# Patient Record
Sex: FEMALE | Race: WHITE | NOT HISPANIC OR LATINO | Employment: FULL TIME | URBAN - METROPOLITAN AREA
[De-identification: names, ages, dates, MRNs, and addresses within clinical notes are randomized per-mention and may not be internally consistent; named-entity substitution may affect disease eponyms.]

---

## 2019-06-03 ENCOUNTER — HOSPITAL ENCOUNTER (OUTPATIENT)
Dept: URGENT CARE | Facility: CLINIC | Age: 29
Discharge: HOME | End: 2019-06-03

## 2020-02-12 ENCOUNTER — HOSPITAL ENCOUNTER (OUTPATIENT)
Dept: URGENT CARE | Facility: CLINIC | Age: 30
Discharge: HOME | End: 2020-02-12
Attending: FAMILY MEDICINE

## 2020-02-14 LAB — BACTERIA SPEC AEROBE CULT: NORMAL

## 2020-10-16 ENCOUNTER — HOSPITAL ENCOUNTER (OUTPATIENT)
Dept: URGENT CARE | Facility: CLINIC | Age: 30
Discharge: HOME | End: 2020-10-16
Attending: PHYSICIAN ASSISTANT

## 2020-10-17 LAB — BACTERIA SPEC AEROBE CULT: ABNORMAL

## 2021-06-10 LAB
ANION GAP SERPL CALCULATED.3IONS-SCNC: 10.6 MMOL/L (ref 5–15)
BASOPHILS # BLD AUTO: 0.04 10*3/MM3 (ref 0–0.2)
BASOPHILS NFR BLD AUTO: 0.3 % (ref 0–1.5)
BUN SERPL-MCNC: 8 MG/DL (ref 6–20)
BUN/CREAT SERPL: 13.6 (ref 7–25)
CALCIUM SPEC-SCNC: 9.6 MG/DL (ref 8.6–10.5)
CHLORIDE SERPL-SCNC: 101 MMOL/L (ref 98–107)
CO2 SERPL-SCNC: 24.4 MMOL/L (ref 22–29)
CREAT SERPL-MCNC: 0.59 MG/DL (ref 0.57–1)
D DIMER PPP FEU-MCNC: 1.95 MG/L (FEU) (ref 0–0.59)
DEPRECATED RDW RBC AUTO: 40.7 FL (ref 37–54)
EOSINOPHIL # BLD AUTO: 0.29 10*3/MM3 (ref 0–0.4)
EOSINOPHIL NFR BLD AUTO: 2.4 % (ref 0.3–6.2)
ERYTHROCYTE [DISTWIDTH] IN BLOOD BY AUTOMATED COUNT: 12.4 % (ref 12.3–15.4)
GFR SERPL CREATININE-BSD FRML MDRD: 121 ML/MIN/1.73
GFR SERPL CREATININE-BSD FRML MDRD: 147 ML/MIN/1.73
GLUCOSE SERPL-MCNC: 125 MG/DL (ref 65–99)
HCT VFR BLD AUTO: 36.9 % (ref 34–46.6)
HGB BLD-MCNC: 12.5 G/DL (ref 12–15.9)
IMM GRANULOCYTES # BLD AUTO: 0.1 10*3/MM3 (ref 0–0.05)
IMM GRANULOCYTES NFR BLD AUTO: 0.8 % (ref 0–0.5)
LYMPHOCYTES # BLD AUTO: 2.62 10*3/MM3 (ref 0.7–3.1)
LYMPHOCYTES NFR BLD AUTO: 21.6 % (ref 19.6–45.3)
MCH RBC QN AUTO: 30.3 PG (ref 26.6–33)
MCHC RBC AUTO-ENTMCNC: 33.9 G/DL (ref 31.5–35.7)
MCV RBC AUTO: 89.6 FL (ref 79–97)
MONOCYTES # BLD AUTO: 0.85 10*3/MM3 (ref 0.1–0.9)
MONOCYTES NFR BLD AUTO: 7 % (ref 5–12)
NEUTROPHILS NFR BLD AUTO: 67.9 % (ref 42.7–76)
NEUTROPHILS NFR BLD AUTO: 8.22 10*3/MM3 (ref 1.7–7)
NRBC BLD AUTO-RTO: 0 /100 WBC (ref 0–0.2)
PLATELET # BLD AUTO: 249 10*3/MM3 (ref 140–450)
PMV BLD AUTO: 10 FL (ref 6–12)
POTASSIUM SERPL-SCNC: 4.1 MMOL/L (ref 3.5–5.2)
RBC # BLD AUTO: 4.12 10*6/MM3 (ref 3.77–5.28)
SODIUM SERPL-SCNC: 136 MMOL/L (ref 136–145)
WBC # BLD AUTO: 12.12 10*3/MM3 (ref 3.4–10.8)

## 2021-06-10 PROCEDURE — 80048 BASIC METABOLIC PNL TOTAL CA: CPT | Performed by: EMERGENCY MEDICINE

## 2021-06-10 PROCEDURE — 36415 COLL VENOUS BLD VENIPUNCTURE: CPT | Performed by: EMERGENCY MEDICINE

## 2021-06-10 PROCEDURE — 85379 FIBRIN DEGRADATION QUANT: CPT | Performed by: EMERGENCY MEDICINE

## 2021-06-10 PROCEDURE — 99282 EMERGENCY DEPT VISIT SF MDM: CPT

## 2021-06-10 PROCEDURE — 85025 COMPLETE CBC W/AUTO DIFF WBC: CPT | Performed by: EMERGENCY MEDICINE

## 2021-06-10 PROCEDURE — 99283 EMERGENCY DEPT VISIT LOW MDM: CPT

## 2021-06-11 ENCOUNTER — HOSPITAL ENCOUNTER (EMERGENCY)
Facility: HOSPITAL | Age: 31
Discharge: HOME | End: 2021-06-11
Attending: EMERGENCY MEDICINE | Admitting: EMERGENCY MEDICINE

## 2021-06-11 ENCOUNTER — APPOINTMENT (OUTPATIENT)
Dept: CARDIOLOGY | Facility: HOSPITAL | Age: 31
End: 2021-06-11

## 2021-06-11 VITALS
OXYGEN SATURATION: 98 % | DIASTOLIC BLOOD PRESSURE: 87 MMHG | HEART RATE: 103 BPM | BODY MASS INDEX: 31.42 KG/M2 | TEMPERATURE: 98.1 F | SYSTOLIC BLOOD PRESSURE: 138 MMHG | RESPIRATION RATE: 18 BRPM | WEIGHT: 200.18 LBS | HEIGHT: 67 IN

## 2021-06-11 DIAGNOSIS — S86.811A STRAIN OF CALF MUSCLE, RIGHT, INITIAL ENCOUNTER: Primary | ICD-10-CM

## 2021-06-11 DIAGNOSIS — Z3A.29 29 WEEKS GESTATION OF PREGNANCY: ICD-10-CM

## 2021-06-11 LAB
BH CV LOWER VASCULAR LEFT COMMON FEMORAL AUGMENT: NORMAL
BH CV LOWER VASCULAR LEFT COMMON FEMORAL COMPETENT: NORMAL
BH CV LOWER VASCULAR LEFT COMMON FEMORAL COMPRESS: NORMAL
BH CV LOWER VASCULAR LEFT COMMON FEMORAL PHASIC: NORMAL
BH CV LOWER VASCULAR LEFT COMMON FEMORAL SPONT: NORMAL
BH CV LOWER VASCULAR RIGHT COMMON FEMORAL AUGMENT: NORMAL
BH CV LOWER VASCULAR RIGHT COMMON FEMORAL COMPETENT: NORMAL
BH CV LOWER VASCULAR RIGHT COMMON FEMORAL COMPRESS: NORMAL
BH CV LOWER VASCULAR RIGHT COMMON FEMORAL PHASIC: NORMAL
BH CV LOWER VASCULAR RIGHT COMMON FEMORAL SPONT: NORMAL
BH CV LOWER VASCULAR RIGHT DISTAL FEMORAL COMPRESS: NORMAL
BH CV LOWER VASCULAR RIGHT GREATER SAPH AK COMPRESS: NORMAL
BH CV LOWER VASCULAR RIGHT MID FEMORAL AUGMENT: NORMAL
BH CV LOWER VASCULAR RIGHT MID FEMORAL COMPETENT: NORMAL
BH CV LOWER VASCULAR RIGHT MID FEMORAL COMPRESS: NORMAL
BH CV LOWER VASCULAR RIGHT MID FEMORAL PHASIC: NORMAL
BH CV LOWER VASCULAR RIGHT MID FEMORAL SPONT: NORMAL
BH CV LOWER VASCULAR RIGHT PERONEAL COMPRESS: NORMAL
BH CV LOWER VASCULAR RIGHT POPLITEAL AUGMENT: NORMAL
BH CV LOWER VASCULAR RIGHT POPLITEAL COMPETENT: NORMAL
BH CV LOWER VASCULAR RIGHT POPLITEAL COMPRESS: NORMAL
BH CV LOWER VASCULAR RIGHT POPLITEAL PHASIC: NORMAL
BH CV LOWER VASCULAR RIGHT POPLITEAL SPONT: NORMAL
BH CV LOWER VASCULAR RIGHT POSTERIOR TIBIAL COMPRESS: NORMAL
BH CV LOWER VASCULAR RIGHT PROFUNDA FEMORAL COMPRESS: NORMAL
BH CV LOWER VASCULAR RIGHT PROXIMAL FEMORAL COMPRESS: NORMAL
BH CV LOWER VASCULAR RIGHT SAPHENOFEMORAL JUNCTION COMPRESS: NORMAL
MAXIMAL PREDICTED HEART RATE: 192 BPM
STRESS TARGET HR: 163 BPM

## 2021-06-11 PROCEDURE — 93971 EXTREMITY STUDY: CPT | Performed by: SURGERY

## 2021-06-11 PROCEDURE — 93971 EXTREMITY STUDY: CPT

## 2021-06-11 NOTE — ED PROVIDER NOTES
Subjective   Pt reports that she hasp resented to the ED with complaints of right leg pain that she has had for about 2 days. She states that she was originally awoken from sleep with 3 different episodes in one night due to pain. She states that she spoke to her coworker about this and they thought it sounded like a possible DVT. 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     Pt reports that she is currently 29 weeks pregnant.       History provided by:  Patient   used: No    Leg Pain  Location:  Leg  Injury: no    Leg location:  R leg  Pain details:     Quality:  Cramping    Radiates to:  Does not radiate    Severity:  Moderate    Onset quality:  Sudden    Timing:  Constant    Progression:  Unchanged  Chronicity:  New  Dislocation: no    Foreign body present:  No foreign bodies  Tetanus status:  Unknown  Prior injury to area:  No  Relieved by:  None tried  Worsened by:  Nothing  Ineffective treatments:  None tried  Associated symptoms: no back pain, no decreased ROM, no fatigue, no fever, no itching, no muscle weakness, no neck pain, no numbness, no stiffness, no swelling and no tingling        Review of Systems   Constitutional: Negative for chills, fatigue and fever.   HENT: Negative for congestion, ear pain, rhinorrhea, sore throat and tinnitus.    Eyes: Negative for photophobia and pain.   Respiratory: Negative for choking and shortness of breath.    Cardiovascular: Negative for chest pain.   Gastrointestinal: Negative for abdominal pain, diarrhea, nausea and vomiting.   Endocrine: Negative for polydipsia.   Genitourinary: Negative for dysuria and hematuria.   Musculoskeletal: Negative for back pain, neck pain and stiffness.   Skin: Negative for itching and rash.   Allergic/Immunologic: Negative for food allergies.   Neurological: Negative for dizziness, seizures, numbness and headaches.    Psychiatric/Behavioral: Negative for self-injury and suicidal ideas.   All other systems reviewed and are negative.      History reviewed. No pertinent past medical history.    Allergies   Allergen Reactions   • Oxycodone-Acetaminophen Unknown - High Severity, Other (See Comments) and Swelling     Numbness and tingling     • Topiramate Other (See Comments) and Unknown - High Severity   • Wasp Venom Hives       Past Surgical History:   Procedure Laterality Date   • WISDOM TOOTH EXTRACTION         History reviewed. No pertinent family history.    Social History     Socioeconomic History   • Marital status: Single     Spouse name: Not on file   • Number of children: Not on file   • Years of education: Not on file   • Highest education level: Not on file   Tobacco Use   • Smoking status: Never Smoker   • Smokeless tobacco: Never Used   Vaping Use   • Vaping Use: Never used   Substance and Sexual Activity   • Alcohol use: Never   • Drug use: Never   • Sexual activity: Defer         Objective   Physical Exam  HENT:      Head: Normocephalic and atraumatic.      Right Ear: Tympanic membrane, ear canal and external ear normal.      Left Ear: Tympanic membrane, ear canal and external ear normal.      Nose: Nose normal.      Mouth/Throat:      Mouth: Mucous membranes are moist.   Eyes:      General: Lids are normal.   Cardiovascular:      Rate and Rhythm: Normal rate and regular rhythm.      Pulses: Normal pulses.           Dorsalis pedis pulses are 2+ on the right side and 2+ on the left side.      Heart sounds: Normal heart sounds.   Pulmonary:      Effort: Pulmonary effort is normal.      Breath sounds: Normal breath sounds and air entry.   Abdominal:      General: Bowel sounds are normal.      Palpations: Abdomen is soft.      Comments: GRAVID UTERUS CONSISTENT WITH DATES   Musculoskeletal:      Right shoulder: Normal.      Left shoulder: Normal.      Right upper arm: Normal.      Left upper arm: Normal.      Right  elbow: Normal.      Left elbow: Normal.      Right forearm: Normal.      Left forearm: Normal.      Right wrist: Normal.      Left wrist: Normal.      Right hand: Normal.      Left hand: Normal.      Cervical back: Normal, full passive range of motion without pain, normal range of motion and neck supple.      Thoracic back: Normal.      Lumbar back: Normal.      Right hip: Normal.      Left hip: Normal.      Right upper leg: Normal.      Left upper leg: Normal.      Right knee: Normal.      Left knee: Normal.      Right lower leg: Tenderness (Moderate tenderness posterior right calf) present. Edema (MILD) present.      Left lower leg: Normal.      Right ankle: Normal.      Right Achilles Tendon: Normal.      Left ankle: Normal.      Left Achilles Tendon: Normal.      Right foot: Normal.      Left foot: Normal.   Skin:     General: Skin is warm and dry.   Neurological:      General: No focal deficit present.      Mental Status: She is alert and oriented to person, place, and time. Mental status is at baseline.      Cranial Nerves: Cranial nerves are intact.      Sensory: Sensation is intact.      Motor: Motor function is intact.      Coordination: Coordination is intact.   Psychiatric:         Attention and Perception: Attention and perception normal.         Mood and Affect: Mood and affect normal.         Speech: Speech normal.         Behavior: Behavior normal. Behavior is cooperative.         Thought Content: Thought content normal.         Cognition and Memory: Cognition and memory normal.         Judgment: Judgment normal.         Procedures         ED Course  ED Course as of Jun 11 0657 Fri Jun 11, 2021 0311 Patient is 29 weeks pregnant.   D-Dimer, Quant(!): 1.95 [VS]      ED Course User Index  [VS] Gene Farah MD     This patient is a pleasant 30-year-old female currently at 29 weeks gestational age in her pregnancy who was awakened the other night 3 times in a row with severe muscle cramps in her  right calf, and the last day or so has had pain in the posterior calf following this.    She was told by a friend this could represent a DVT and to come to the ER.    I reviewed her lab work that had already been ordered and her D-dimer was elevated in the setting of third trimester pregnancy, but also in the setting of the calf pain.    At this point I had to order a Doppler ultrasound of the right lower extremity to rule out DVT, and fortunately this came back negative.    I am diagnosing her with calf muscle strain following a muscle cramp, and recommend supportive care instructions including rest stretching elevation and Tylenol.                                       MDM    Final diagnoses:   Strain of calf muscle, right, initial encounter   29 weeks gestation of pregnancy       Documentation assistance provided by ford Fernandez.  Information recorded by the scribe was done at my direction and has been verified and validated by me.     Kristin Fernandez  06/11/21 0315       Gene Farah MD  06/11/21 2043

## 2024-02-20 ENCOUNTER — OFFICE VISIT (OUTPATIENT)
Dept: SLEEP MEDICINE | Facility: HOSPITAL | Age: 34
End: 2024-02-20
Payer: COMMERCIAL

## 2024-02-20 VITALS
HEIGHT: 66 IN | OXYGEN SATURATION: 97 % | BODY MASS INDEX: 26.97 KG/M2 | HEART RATE: 76 BPM | WEIGHT: 167.8 LBS | SYSTOLIC BLOOD PRESSURE: 110 MMHG | DIASTOLIC BLOOD PRESSURE: 62 MMHG

## 2024-02-20 DIAGNOSIS — R06.83 SNORING: ICD-10-CM

## 2024-02-20 DIAGNOSIS — R06.81 WITNESSED APNEIC SPELLS: ICD-10-CM

## 2024-02-20 DIAGNOSIS — G47.10 HYPERSOMNIA: ICD-10-CM

## 2024-02-20 DIAGNOSIS — R29.818 SUSPECTED SLEEP APNEA: Primary | ICD-10-CM

## 2024-02-20 DIAGNOSIS — E66.3 OVERWEIGHT (BMI 25.0-29.9): ICD-10-CM

## 2024-02-20 PROCEDURE — G0463 HOSPITAL OUTPT CLINIC VISIT: HCPCS

## 2024-02-20 RX ORDER — TOPIRAMATE 25 MG/1
25 TABLET ORAL EVERY EVENING
COMMUNITY
Start: 2024-01-31

## 2024-02-20 NOTE — PROGRESS NOTES
Sleep Consultation    Patient Name: Ingrid Corado  Age/Sex: 33 y.o. female  : 1990  MRN: [id number]    Date of Encounter Visit: 2024  Encounter Provider: Ladarius Gunn MD  Referring Provider: Tierra Waite*  Place of Service: [facility]  Patient Care Team:  Wen Beard APRN as PCP - General (Nurse Practitioner)    Subjective:     Reason for Consult: JOSE evaluation    History of Present Illness:  Ingrid Corado is a 33 y.o. female is here for evaluation of JOSE due to suggestive symptoms.    Patient complains of daytime fatigue and sleepiness with an [place] Sleepiness Scale (ESS) of 12.  Patient is on phentermine (and topiramate) for weight loss which can help with any insomnia because it is neurostimulant as well  Patient complains of loud snoring waking up gasping for breath, being told that she could breathing at night, morning headache, and waking up with a sore dry mouth  Denies any symptoms of restless leg syndrome.  But she does have some leg jerking in the middle of the night  Patient denies any cataplexy, sleep paralysis or other symptoms to suggest narcolepsy.  Patient does have history of sleepwalking  Denies any history of seizure disorder or recent head trauma.  Patient spends adequate amount of time in bed with no evidence of sleep restriction or improper sleep hygiene.  Bedtime is around 10 PM wake up time 6 in the morning with sleep onset of the normal and patient still wake up feeling sleepy and tired  Caffeine intake is usually half cup of coffee per day, no alcohol smoking or illicit substance abuse  Comorbidities include: Overweight    Review of Systems:   A twelve-system review was conducted and was negative except for the following: Fatigue.        Past Medical History:  Past Medical History:   Diagnosis Date    Anemia        Past Surgical History:   Procedure Laterality Date    DENTAL PROCEDURE      WISDOM TOOTH EXTRACTION         Home  "Medications:     Current Outpatient Medications:     phentermine 15 MG capsule, , Disp: , Rfl:     topiramate (TOPAMAX) 25 MG tablet, Take 1 tablet by mouth Every Evening., Disp: , Rfl:     azithromycin (Zithromax Z-Deandre) 250 MG tablet, Take 2 tablets by mouth on day 1, then 1 tablet daily on days 2-5 (Patient not taking: Reported on 2/20/2024), Disp: 6 tablet, Rfl: 0    promethazine-dextromethorphan (PROMETHAZINE-DM) 6.25-15 MG/5ML syrup, Take 5 mL by mouth 4 (Four) Times a Day As Needed for Cough. (Patient not taking: Reported on 2/20/2024), Disp: 100 mL, Rfl: 0    Allergies:  Allergies   Allergen Reactions    Oxycodone-Acetaminophen Other (See Comments), Swelling, Unknown - High Severity and Anaphylaxis     Numbness and tingling    Topiramate Anaphylaxis, Swelling, Other (See Comments) and Unknown - High Severity     Other reaction(s): tongue swelling    **Patient states she is currently taking medication for weight loss-2/20/24    Wasp Venom Hives       Past Social History:  Social History     Socioeconomic History    Marital status: Single   Tobacco Use    Smoking status: Never     Passive exposure: Never    Smokeless tobacco: Never   Vaping Use    Vaping Use: Never used   Substance and Sexual Activity    Alcohol use: Never    Drug use: Never    Sexual activity: Defer       Past Family History:  Family History   Problem Relation Age of Onset    Sleep apnea Mother      Positive family history for obstructive sleep apnea  Objective:        Vital Signs:   Visit Vitals  /62   Pulse 76   Ht 167.6 cm (66\")   Wt 76.1 kg (167 lb 12.8 oz)   SpO2 97%   BMI 27.08 kg/m²     Wt Readings from Last 3 Encounters:   02/20/24 76.1 kg (167 lb 12.8 oz)   01/12/24 77.1 kg (170 lb)   11/06/23 75.3 kg (166 lb 1.6 oz)     Neck Circumference: 13.5 inches    Physical Exam:   GEN:  No acute distress, alert, cooperative, well developed   EYES:   Sclerae clear. No icterus. PERRL. Normal EOM  ENT:   External ears/nose normal, no oral " lesions, no thrush, mucous membranes moist, Septum midline. Mallampati II airway. Redundant membranous soft palate   NECK:  Supple, midline trachea, no JVD  LUNGS: Normal chest on inspection, CTAB, no wheezes. No rhonchi. No crackles. Respirations regular, even and unlabored.   CV:  Regular rhythm and rate. Normal S1/S2. No murmurs, gallops, or rubs noted.  ABD:  Soft, nontender and nondistended. Normal bowel sounds. No guarding  EXT:  Moves all extremities well. No cyanosis. No redness. No edema.   Skin: Dry, intact, no bleeding      Diagnostic Data:  No prior sleep studies performed     Assessment and Plan:       ICD-10-CM ICD-9-CM   1. Suspected sleep apnea  R29.818 781.99   2. Overweight (BMI 25.0-29.9)  E66.3 278.02   3. Snoring  R06.83 786.09   4. Witnessed apneic spells  R06.81 786.03   5. Hypersomnia  G47.10 780.54       Recommendations:     Patient is a good candidate for the home sleep study which will be ordered today  If the home sleep study is inconclusive or nondiagnostic, we will consider the in-lab sleep study  Patient is agreeable with the CPAP therapy and will be initiated accordingly      Patient was educated in depth about JOSE and cardiovascular consequences if left untreated, including but not limited to CHF, CAD, arrhythmias, CVA, and/or HTN. Education also provided about the diagnostic tools for JOSE, including the polysomnography and the treatment modalities, including the CPAP.     If patient has obstructive sleep apnea the recommend treatment is CPAP and will start CPAP and patient will follow up within 31-90 days after starting CPAP for compliance review.   Will address alternative treatment option if intolerant to CPAP     Adherence to the CPAP is a key factor in successful treatment of JOSE and the patient was encouraged to contact us in case of problem with the CPAP or the mask that can limit the tolerance of the compliance with the therapy.    Patient was educated about the impact of  obesity on sleep apnea and the benefit of weight loss and weight loss was recommended    Orders Placed This Encounter   Procedures    Home Sleep Study     No orders of the defined types were placed in this encounter.     Return in about 3 months (around 5/20/2024).    Ladarius Gunn MD   [facility]   02/20/24  11:48 EST    Dictated utilizing Dragon dictation

## 2024-03-06 ENCOUNTER — HOSPITAL ENCOUNTER (OUTPATIENT)
Dept: SLEEP MEDICINE | Facility: HOSPITAL | Age: 34
Discharge: HOME | End: 2024-03-06
Admitting: INTERNAL MEDICINE
Payer: COMMERCIAL

## 2024-03-06 DIAGNOSIS — R06.83 SNORING: ICD-10-CM

## 2024-03-06 DIAGNOSIS — R29.818 SUSPECTED SLEEP APNEA: ICD-10-CM

## 2024-03-06 DIAGNOSIS — R06.81 WITNESSED APNEIC SPELLS: ICD-10-CM

## 2024-03-06 DIAGNOSIS — E66.3 OVERWEIGHT (BMI 25.0-29.9): ICD-10-CM

## 2024-03-06 PROCEDURE — 95806 SLEEP STUDY UNATT&RESP EFFT: CPT

## 2024-03-20 DIAGNOSIS — G47.33 OSA (OBSTRUCTIVE SLEEP APNEA): Primary | ICD-10-CM

## 2024-04-02 ENCOUNTER — TELEPHONE (OUTPATIENT)
Dept: SLEEP MEDICINE | Facility: HOSPITAL | Age: 34
End: 2024-04-02
Payer: COMMERCIAL

## 2024-11-15 ENCOUNTER — OFFICE VISIT (OUTPATIENT)
Dept: INTERNAL MEDICINE | Age: 34
End: 2024-11-15
Payer: COMMERCIAL

## 2024-11-15 VITALS
DIASTOLIC BLOOD PRESSURE: 62 MMHG | HEART RATE: 71 BPM | HEIGHT: 66 IN | BODY MASS INDEX: 29.51 KG/M2 | RESPIRATION RATE: 18 BRPM | WEIGHT: 183.6 LBS | SYSTOLIC BLOOD PRESSURE: 130 MMHG | OXYGEN SATURATION: 98 % | TEMPERATURE: 97.5 F

## 2024-11-15 DIAGNOSIS — Z76.89 ENCOUNTER TO ESTABLISH CARE: Primary | ICD-10-CM

## 2024-11-15 DIAGNOSIS — Z23 NEEDS FLU SHOT: ICD-10-CM

## 2024-11-15 DIAGNOSIS — E66.3 OVERWEIGHT (BMI 25.0-29.9): ICD-10-CM

## 2024-11-15 DIAGNOSIS — Z12.31 ENCOUNTER FOR SCREENING MAMMOGRAM FOR MALIGNANT NEOPLASM OF BREAST: ICD-10-CM

## 2024-11-15 DIAGNOSIS — Z13.220 SCREENING FOR LIPID DISORDERS: ICD-10-CM

## 2024-11-15 DIAGNOSIS — Z87.898 HISTORY OF PREDIABETES: ICD-10-CM

## 2024-11-15 DIAGNOSIS — Z00.00 ANNUAL PHYSICAL EXAM: ICD-10-CM

## 2024-11-15 DIAGNOSIS — G47.33 OSA (OBSTRUCTIVE SLEEP APNEA): ICD-10-CM

## 2024-11-15 DIAGNOSIS — Z80.3 FAMILY HISTORY OF BREAST CANCER: ICD-10-CM

## 2024-11-15 NOTE — PROGRESS NOTES
"Chief Complaint  Annual Exam (34 year old female here today for annual physical and pt needs labs/) and Establish Care (34 year old female here today to establish care )    History of Present Illness  SUBJECTIVE  Ingrid Corado presents to [facility] to establish care.    Personal family medical history were reviewed with patient and updated in her medical chart.  She would like to discuss her weight today.    She denies any chest pain, shortness of breath, potation's, nausea, vomiting, diarrhea, issues with bowel/ bladder habits.    Past Medical History:   Diagnosis Date    Anemia     Sleep apnea in adult 2024      Family History   Problem Relation Age of Onset    Sleep apnea Mother       Past Surgical History:   Procedure Laterality Date    DENTAL PROCEDURE      WISDOM TOOTH EXTRACTION        No current outpatient medications on file.    OBJECTIVE  Vital Signs:   /62 (BP Location: Left arm, Patient Position: Sitting)   Pulse 71   Temp 97.5 °F (36.4 °C) (Temporal)   Resp 18   Ht 167.6 cm (65.98\")   Wt 83.3 kg (183 lb 9.6 oz)   SpO2 98%   BMI 29.65 kg/m²    Estimated body mass index is 29.65 kg/m² as calculated from the following:    Height as of this encounter: 167.6 cm (65.98\").    Weight as of this encounter: 83.3 kg (183 lb 9.6 oz).     Wt Readings from Last 3 Encounters:   11/15/24 83.3 kg (183 lb 9.6 oz)   02/20/24 76.1 kg (167 lb 12.8 oz)   01/12/24 77.1 kg (170 lb)     BP Readings from Last 3 Encounters:   11/15/24 130/62   02/20/24 110/62   01/12/24 113/75       Physical Exam  Vitals and nursing note reviewed.   Constitutional:       Appearance: Normal appearance.   HENT:      Head: Normocephalic.      Right Ear: Tympanic membrane normal.      Left Ear: Tympanic membrane normal.   Eyes:      Extraocular Movements: Extraocular movements intact.      Conjunctiva/sclera: Conjunctivae normal.   Cardiovascular:      Rate and Rhythm: Normal rate and regular rhythm. "      Heart sounds: Normal heart sounds. No murmur heard.  Pulmonary:      Effort: Pulmonary effort is normal.      Breath sounds: Normal breath sounds. No wheezing or rales.   Abdominal:      General: Bowel sounds are normal.      Palpations: Abdomen is soft.      Tenderness: There is no abdominal tenderness. There is no guarding.   Musculoskeletal:         General: No swelling. Normal range of motion.      Cervical back: Normal range of motion and neck supple.   Skin:     General: Skin is warm and dry.   Neurological:      General: No focal deficit present.      Mental Status: She is alert and oriented to person, place, and time. Mental status is at baseline.   Psychiatric:         Mood and Affect: Mood normal.         Behavior: Behavior normal.         Thought Content: Thought content normal.         Judgment: Judgment normal.          Result Review        No Images in the past 120 days found..     The above data has been reviewed by JAH Diaz 11/15/2024 09:16 EST.          Patient Care Team:  Paige Perera APRN as PCP - General (Internal Medicine)    BMI is >= 25 and <30. (Overweight) The following options were offered after discussion;: exercise counseling/recommendations and nutrition counseling/recommendations       ASSESSMENT & PLAN    Diagnoses and all orders for this visit:    1. Encounter to establish care (Primary)  -     Comprehensive metabolic panel; Future  -     CBC w AUTO Differential; Future    2. Annual physical exam  Assessment & Plan:  PAP-total woman  Tobacco use-denies use  Alcohol use-occasionally  Flu vaccine-today  , 1 daughter.   Maternal aunt-cervical cancer/breast cancer diagnosed at age 40/thyroid cancer (s/p thyroidectomy)/colon cancer. Breast cancer primary.  Recommend regular dental and eye exams, healthy diet and regular exercise    Orders:  -     CBC w AUTO Differential; Future  -     Comprehensive metabolic panel; Future  -     TSH+Free T4; Future  -     Cancel:  TSH+Free T4  -     Cancel: Comprehensive metabolic panel  -     Cancel: CBC w AUTO Differential  -     Lipid panel; Future  -     TSH+Free T4; Future    3. JOSE (obstructive sleep apnea)  Assessment & Plan:  Compliant with CPAP  Managed by sleep medicine      4. Family history of breast cancer  -     Mammo Screening Digital Tomosynthesis Bilateral With CAD; Future    5. Encounter for screening mammogram for malignant neoplasm of breast  -     Mammo Screening Digital Tomosynthesis Bilateral With CAD; Future    6. Screening for lipid disorders  -     Lipid panel; Future  -     Cancel: Lipid panel    7. History of prediabetes  -     Hemoglobin A1c; Future  -     Cancel: Hemoglobin A1c  -     Hemoglobin A1c; Future    8. Overweight (BMI 25.0-29.9)  Assessment & Plan:  Patient's (Body mass index is 29.65 kg/m².) indicates that they are overweight with health conditions that include impaired fasting glucose . Weight is unchanged. BMI is above average; BMI management plan is completed. We discussed portion control and increasing exercise. Patient states that she has recently been on phentermine and was unsuccessful with weight loss. She has been trying diet and exercise and has not been successful with weight loss.   We discussed alternative therapy for treatment such as zepbound  Risks/benefits discussed with patient.   Patient reports her aunt has a hx of thyroid cancer but is unsure the type. She states she will let me know. We discussed the zepbound is contraindicated if there is a family history of medullary thyroid cancer      9. Needs flu shot    Other orders  -     Fluzone >6mos ([id number])         Tobacco Use: Low Risk  (11/15/2024)    Patient History     Smoking Tobacco Use: Never     Smokeless Tobacco Use: Never     Passive Exposure: Never       Follow Up     Return in about 3 months (around 2/15/2025) for Recheck.    Please note that portions of this note were completed with a voice recognition  program.    Patient was given instructions and counseling regarding her condition or for health maintenance advice. Please see specific information pulled into the AVS if appropriate.   I have reviewed information obtained and documented by others and I have confirmed the accuracy of this documented note.    JAH Diaz

## 2024-11-15 NOTE — ASSESSMENT & PLAN NOTE
Patient's (Body mass index is 29.65 kg/m².) indicates that they are overweight with health conditions that include impaired fasting glucose . Weight is unchanged. BMI is above average; BMI management plan is completed. We discussed portion control and increasing exercise. Patient states that she has recently been on phentermine and was unsuccessful with weight loss. She has been trying diet and exercise and has not been successful with weight loss.   We discussed alternative therapy for treatment such as zepbound  Risks/benefits discussed with patient.   Patient reports her aunt has a hx of thyroid cancer but is unsure the type. She states she will let me know. We discussed the zepbound is contraindicated if there is a family history of medullary thyroid cancer

## 2024-11-15 NOTE — ASSESSMENT & PLAN NOTE
PAP-total woman  Tobacco use-denies use  Alcohol use-occasionally  Flu vaccine-today  , 1 daughter.   Maternal aunt-cervical cancer/breast cancer diagnosed at age 40/thyroid cancer (s/p thyroidectomy)/colon cancer. Breast cancer primary.  Recommend regular dental and eye exams, healthy diet and regular exercise

## 2024-12-30 ENCOUNTER — HOSPITAL ENCOUNTER (OUTPATIENT)
Dept: MAMMOGRAPHY | Facility: HOSPITAL | Age: 34
Discharge: HOME | End: 2024-12-30
Payer: COMMERCIAL

## 2024-12-30 DIAGNOSIS — Z12.31 ENCOUNTER FOR SCREENING MAMMOGRAM FOR MALIGNANT NEOPLASM OF BREAST: ICD-10-CM

## 2024-12-30 DIAGNOSIS — Z80.3 FAMILY HISTORY OF BREAST CANCER: ICD-10-CM

## 2024-12-30 PROCEDURE — 77063 BREAST TOMOSYNTHESIS BI: CPT

## 2024-12-30 PROCEDURE — 77067 SCR MAMMO BI INCL CAD: CPT

## 2024-12-31 ENCOUNTER — OFFICE VISIT (OUTPATIENT)
Dept: INTERNAL MEDICINE | Age: 34
End: 2024-12-31
Payer: COMMERCIAL

## 2024-12-31 VITALS
HEIGHT: 66 IN | RESPIRATION RATE: 16 BRPM | DIASTOLIC BLOOD PRESSURE: 70 MMHG | SYSTOLIC BLOOD PRESSURE: 105 MMHG | HEART RATE: 79 BPM | TEMPERATURE: 97.8 F | WEIGHT: 183.6 LBS | BODY MASS INDEX: 29.51 KG/M2 | OXYGEN SATURATION: 98 %

## 2024-12-31 DIAGNOSIS — E66.3 OVERWEIGHT (BMI 25.0-29.9): ICD-10-CM

## 2024-12-31 DIAGNOSIS — R92.8 ABNORMAL MAMMOGRAM: ICD-10-CM

## 2024-12-31 DIAGNOSIS — Z12.4 SCREENING FOR CERVICAL CANCER: ICD-10-CM

## 2024-12-31 DIAGNOSIS — Z01.419 WELL WOMAN EXAM WITH ROUTINE GYNECOLOGICAL EXAM: Primary | ICD-10-CM

## 2024-12-31 PROCEDURE — 88175 CYTOPATH C/V AUTO FLUID REDO: CPT

## 2024-12-31 PROCEDURE — 99214 OFFICE O/P EST MOD 30 MIN: CPT

## 2024-12-31 PROCEDURE — 87798 DETECT AGENT NOS DNA AMP: CPT

## 2024-12-31 PROCEDURE — 87591 N.GONORRHOEAE DNA AMP PROB: CPT

## 2024-12-31 PROCEDURE — 87491 CHLMYD TRACH DNA AMP PROBE: CPT

## 2024-12-31 NOTE — PROGRESS NOTES
"Subjective   History of Present Illness    Ingrid Corado is a 34 y.o. female who presents for annual exam.    History of Present Illness  The patient presents for an annual physical exam.    She has been on a regimen of Zepbound 2.5 mg injections, with her final dose scheduled for Thursday. She reports a sensation of satiety earlier than usual but acknowledges that her dietary habits have not been optimal. She admits to not eating healthy and drinking healthy. She is inquiring about getting more injections.    Her last Pap smear was conducted approximately 2 years ago, prior to the birth of her child in 2023. She recalls a recent visit in September where no swabs were taken, and she was informed that her condition was satisfactory. She has a history of 4 pregnancies, 2 of which were surgical, 1 natural, and 1 resulted in miscarriage during the first surgery. Her menstrual cycle started on 12/14/2024. She is currently using condoms for contraception. She has never had an abnormal Pap smear. She has received 3 doses of the Gardasil vaccine. She reports no known history of cervical, uterine, or ovarian cancer. She underwent her first mammogram yesterday at [facility]. She performs monthly self-breast exams. She has not yet completed her lab work. She reports no concerns related to yeast infections or itching. She believes she is currently ovulating and describes her discharge as clear and slippery.    FAMILY HISTORY  Her aunt had breast cancer in her 40s. She does not have a family history of colon cancer.    MEDICATIONS  Current: Zepbound    IMMUNIZATIONS  She has received 3 doses of the Gardasil vaccine.      Obstetric History:  G-4, Ab-1, L-3  Menstrual History:  LMP-12/14/2024    Sexual History:     Sexually active, 1 partner    No results found for: \"HPVAPTIMA\"    Current contraception: condoms  History of abnormal Pap smear: no  Received Gardasil immunization: yes -    Family history of uterine, " cervical, breast, or ovarian cancer: breast cancer-aunt-40's  Family History of colon cancer/colon polyps: no  Regular self breast exam: yes  History of abnormal mammogram: yes - diagnostic mammo is pending  History of abnormal lipids: no    The following portions of the patient's history were reviewed and updated as appropriate: allergies, current medications, past family history, past medical history, past social history, past surgical history, and problem list.    Objective   Physical Exam  Vitals reviewed. Exam conducted with a chaperone present.   Constitutional:       General: She is not in acute distress.     Appearance: Normal appearance. She is well-developed. She is not diaphoretic.   HENT:      Head: Normocephalic and atraumatic. Hair is normal.      Right Ear: Hearing, tympanic membrane, ear canal and external ear normal. No decreased hearing noted. No drainage.      Left Ear: Hearing, tympanic membrane, ear canal and external ear normal. No decreased hearing noted.      Nose: Nose normal. No nasal deformity.      Mouth/Throat:      Mouth: Mucous membranes are moist.   Eyes:      General: Lids are normal.         Right eye: No discharge.         Left eye: No discharge.      Extraocular Movements: Extraocular movements intact.      Conjunctiva/sclera: Conjunctivae normal.      Pupils: Pupils are equal, round, and reactive to light.   Neck:      Thyroid: No thyromegaly.      Vascular: No JVD.   Cardiovascular:      Rate and Rhythm: Normal rate and regular rhythm.      Pulses: Normal pulses.      Heart sounds: Normal heart sounds. No murmur heard.     No friction rub. No gallop.   Pulmonary:      Effort: Pulmonary effort is normal. No respiratory distress.      Breath sounds: Normal breath sounds. No wheezing or rales.   Chest:      Chest wall: No tenderness.   Breasts:     Breasts are symmetrical.      Right: Normal. No mass, nipple discharge, skin change or tenderness.      Left: Normal. No mass, nipple  "discharge, skin change or tenderness.   Abdominal:      General: Bowel sounds are normal. There is no distension.      Palpations: Abdomen is soft. There is no mass.      Tenderness: There is no abdominal tenderness. There is no guarding or rebound.      Hernia: No hernia is present.   Genitourinary:     General: Normal vulva.      Urethra: No urethral swelling.      Vagina: Normal. No vaginal discharge or lesions.      Cervix: Normal. Discharge present.      Uterus: Normal. Not tender.       Adnexa: Right adnexa normal and left adnexa normal.        Right: No mass or tenderness.          Left: No mass or tenderness.     Musculoskeletal:         General: No tenderness or deformity. Normal range of motion.      Cervical back: Normal range of motion and neck supple.   Lymphadenopathy:      Cervical: No cervical adenopathy.   Skin:     General: Skin is warm and dry.      Findings: No erythema or rash.   Neurological:      Mental Status: She is alert and oriented to person, place, and time.      Cranial Nerves: No cranial nerve deficit.      Motor: No abnormal muscle tone.      Coordination: Coordination normal.      Deep Tendon Reflexes: Reflexes are normal and symmetric. Reflexes normal.   Psychiatric:         Mood and Affect: Mood normal.         Behavior: Behavior normal.         Thought Content: Thought content normal.         Judgment: Judgment normal.         /70 (BP Location: Right arm, Patient Position: Sitting, Cuff Size: Large Adult)   Pulse 79   Temp 97.8 °F (36.6 °C) (Temporal)   Resp 16   Ht 167.6 cm (66\")   Wt 83.3 kg (183 lb 9.6 oz)   SpO2 98%   BMI 29.63 kg/m²   Wt Readings from Last 3 Encounters:   12/31/24 83.3 kg (183 lb 9.6 oz)   11/15/24 83.3 kg (183 lb 9.6 oz)   02/20/24 76.1 kg (167 lb 12.8 oz)      BP Readings from Last 3 Encounters:   12/31/24 105/70   11/15/24 130/62   02/20/24 110/62          Assessment & Plan   Diagnoses and all orders for this visit:    1. Well woman exam with " routine gynecological exam (Primary)    2. Overweight (BMI 25.0-29.9)  -     Tirzepatide-Weight Management (ZEPBOUND) 5 MG/0.5ML solution auto-injector; Inject 0.5 mL under the skin into the appropriate area as directed 1 (One) Time Per Week.  Dispense: 2 mL; Refill: 0    3. Abnormal mammogram  -     Mammo Diagnostic Digital Tomosynthesis Left With CAD; Future  -     US Breast Left Limited; Future    4. Screening for cervical cancer  -     Pap IG, Ct-Ng TV Rfx HPV All; Future  -     Pap IG, Ct-Ng TV Rfx HPV All        Assessment & Plan  1. Weight management.  She has been on Zepbound 2.5 mg and reports feeling jason faster but has not been eating healthily. The dosage of Zepbound will be increased to 5 mg. She is advised to notify the office when she has 1 injection remaining to assess her progress and potentially adjust the dosage further.    2. Health maintenance.  Her last Pap smear was nearly 2 years ago. She had her first mammogram yesterday, which showed a 1.8 cm asymmetry in the left breast at the 12:00 position. The right breast was clear. She has a family history of breast cancer in her aunt, who was diagnosed in her 40s. A Pap smear will be conducted today. A diagnostic mammogram and ultrasound of the left breast will be ordered. She is advised to continue monthly self-breast exams. If she does not hear back about the mammogram and ultrasound by next week, she should call the office to get it scheduled. She is also advised to drink water or Gatorade (0-calorie) before coming in for labs to facilitate blood draw.      All questions answered.  Await pap smear results.  Breast self exam technique reviewed and patient encouraged to perform self-exam monthly.  Diagnosis explained in detail, including differential.  Discussed healthy lifestyle modifications.  Mammogram.  Pap smear.         Patient or patient representative verbalized consent for the use of Ambient Listening during the visit with  Paige Perera  APRN for chart documentation. 12/31/2024  17:03 EST

## 2025-01-03 LAB
C TRACH RRNA CVX QL NAA+PROBE: NEGATIVE
CONV .: NORMAL
CYTOLOGIST CVX/VAG CYTO: NORMAL
CYTOLOGY CVX/VAG DOC CYTO: NORMAL
CYTOLOGY CVX/VAG DOC THIN PREP: NORMAL
DX ICD CODE: NORMAL
Lab: NORMAL
N GONORRHOEA RRNA CVX QL NAA+PROBE: NEGATIVE
OTHER STN SPEC: NORMAL
STAT OF ADQ CVX/VAG CYTO-IMP: NORMAL
T VAGINALIS RRNA SPEC QL NAA+PROBE: NEGATIVE